# Patient Record
Sex: MALE | Race: WHITE | NOT HISPANIC OR LATINO | Employment: OTHER | URBAN - METROPOLITAN AREA
[De-identification: names, ages, dates, MRNs, and addresses within clinical notes are randomized per-mention and may not be internally consistent; named-entity substitution may affect disease eponyms.]

---

## 2018-08-03 ENCOUNTER — APPOINTMENT (OUTPATIENT)
Dept: LAB | Facility: OTHER | Age: 73
End: 2018-08-03

## 2018-08-03 ENCOUNTER — TRANSCRIBE ORDERS (OUTPATIENT)
Dept: LAB | Facility: OTHER | Age: 73
End: 2018-08-03

## 2018-08-03 DIAGNOSIS — I10 HYPERTENSION, ESSENTIAL: Primary | ICD-10-CM

## 2018-08-03 DIAGNOSIS — I10 HYPERTENSION, ESSENTIAL: ICD-10-CM

## 2018-08-03 LAB
ALBUMIN SERPL BCP-MCNC: 4.2 G/DL (ref 3.5–5)
ALP SERPL-CCNC: 95 U/L (ref 46–116)
ALT SERPL W P-5'-P-CCNC: 28 U/L (ref 12–78)
ANION GAP SERPL CALCULATED.3IONS-SCNC: 7 MMOL/L (ref 4–13)
AST SERPL W P-5'-P-CCNC: 22 U/L (ref 5–45)
BILIRUB SERPL-MCNC: 0.9 MG/DL (ref 0.2–1)
BUN SERPL-MCNC: 23 MG/DL (ref 5–25)
CALCIUM SERPL-MCNC: 9.1 MG/DL (ref 8.3–10.1)
CHLORIDE SERPL-SCNC: 106 MMOL/L (ref 100–108)
CHOLEST SERPL-MCNC: 121 MG/DL (ref 50–200)
CO2 SERPL-SCNC: 26 MMOL/L (ref 21–32)
CREAT SERPL-MCNC: 1.2 MG/DL (ref 0.6–1.3)
ERYTHROCYTE [DISTWIDTH] IN BLOOD BY AUTOMATED COUNT: 12.7 % (ref 11.6–15.1)
GFR SERPL CREATININE-BSD FRML MDRD: 60 ML/MIN/1.73SQ M
GLUCOSE P FAST SERPL-MCNC: 98 MG/DL (ref 65–99)
HCT VFR BLD AUTO: 42.4 % (ref 36.5–49.3)
HDLC SERPL-MCNC: 57 MG/DL (ref 40–60)
HGB BLD-MCNC: 13.3 G/DL (ref 12–17)
LDLC SERPL CALC-MCNC: 49 MG/DL (ref 0–100)
MCH RBC QN AUTO: 31.2 PG (ref 26.8–34.3)
MCHC RBC AUTO-ENTMCNC: 31.4 G/DL (ref 31.4–37.4)
MCV RBC AUTO: 100 FL (ref 82–98)
NONHDLC SERPL-MCNC: 64 MG/DL
PLATELET # BLD AUTO: 157 THOUSANDS/UL (ref 149–390)
PMV BLD AUTO: 13.4 FL (ref 8.9–12.7)
POTASSIUM SERPL-SCNC: 4 MMOL/L (ref 3.5–5.3)
PROT SERPL-MCNC: 7.2 G/DL (ref 6.4–8.2)
RBC # BLD AUTO: 4.26 MILLION/UL (ref 3.88–5.62)
SODIUM SERPL-SCNC: 139 MMOL/L (ref 136–145)
TRIGL SERPL-MCNC: 73 MG/DL
WBC # BLD AUTO: 6.81 THOUSAND/UL (ref 4.31–10.16)

## 2018-08-03 PROCEDURE — 85027 COMPLETE CBC AUTOMATED: CPT

## 2018-08-03 PROCEDURE — 36415 COLL VENOUS BLD VENIPUNCTURE: CPT

## 2018-08-03 PROCEDURE — 80061 LIPID PANEL: CPT

## 2018-08-03 PROCEDURE — 80053 COMPREHEN METABOLIC PANEL: CPT

## 2018-11-01 ENCOUNTER — TRANSCRIBE ORDERS (OUTPATIENT)
Dept: LAB | Facility: OTHER | Age: 73
End: 2018-11-01

## 2018-11-01 ENCOUNTER — APPOINTMENT (OUTPATIENT)
Dept: LAB | Facility: OTHER | Age: 73
End: 2018-11-01

## 2018-11-01 DIAGNOSIS — I10 HYPERTENSION, UNSPECIFIED TYPE: ICD-10-CM

## 2018-11-01 DIAGNOSIS — I10 HYPERTENSION, UNSPECIFIED TYPE: Primary | ICD-10-CM

## 2018-11-01 LAB
ALBUMIN SERPL BCP-MCNC: 3.9 G/DL (ref 3.5–5)
ALP SERPL-CCNC: 96 U/L (ref 46–116)
ALT SERPL W P-5'-P-CCNC: 24 U/L (ref 12–78)
ANION GAP SERPL CALCULATED.3IONS-SCNC: 4 MMOL/L (ref 4–13)
AST SERPL W P-5'-P-CCNC: 16 U/L (ref 5–45)
BILIRUB SERPL-MCNC: 0.83 MG/DL (ref 0.2–1)
BUN SERPL-MCNC: 26 MG/DL (ref 5–25)
CALCIUM SERPL-MCNC: 9.4 MG/DL (ref 8.3–10.1)
CHLORIDE SERPL-SCNC: 109 MMOL/L (ref 100–108)
CHOLEST SERPL-MCNC: 113 MG/DL (ref 50–200)
CO2 SERPL-SCNC: 28 MMOL/L (ref 21–32)
CREAT SERPL-MCNC: 1.18 MG/DL (ref 0.6–1.3)
ERYTHROCYTE [DISTWIDTH] IN BLOOD BY AUTOMATED COUNT: 13.2 % (ref 11.6–15.1)
GFR SERPL CREATININE-BSD FRML MDRD: 61 ML/MIN/1.73SQ M
GLUCOSE P FAST SERPL-MCNC: 89 MG/DL (ref 65–99)
HCT VFR BLD AUTO: 39.5 % (ref 36.5–49.3)
HDLC SERPL-MCNC: 59 MG/DL (ref 40–60)
HGB BLD-MCNC: 12.7 G/DL (ref 12–17)
LDLC SERPL CALC-MCNC: 37 MG/DL (ref 0–100)
MCH RBC QN AUTO: 32.2 PG (ref 26.8–34.3)
MCHC RBC AUTO-ENTMCNC: 32.2 G/DL (ref 31.4–37.4)
MCV RBC AUTO: 100 FL (ref 82–98)
NONHDLC SERPL-MCNC: 54 MG/DL
PLATELET # BLD AUTO: 138 THOUSANDS/UL (ref 149–390)
PMV BLD AUTO: 13.2 FL (ref 8.9–12.7)
POTASSIUM SERPL-SCNC: 4.2 MMOL/L (ref 3.5–5.3)
PROT SERPL-MCNC: 6.9 G/DL (ref 6.4–8.2)
RBC # BLD AUTO: 3.95 MILLION/UL (ref 3.88–5.62)
SODIUM SERPL-SCNC: 141 MMOL/L (ref 136–145)
TRIGL SERPL-MCNC: 85 MG/DL
WBC # BLD AUTO: 6.38 THOUSAND/UL (ref 4.31–10.16)

## 2018-11-01 PROCEDURE — 80053 COMPREHEN METABOLIC PANEL: CPT

## 2018-11-01 PROCEDURE — 36415 COLL VENOUS BLD VENIPUNCTURE: CPT

## 2018-11-01 PROCEDURE — 85027 COMPLETE CBC AUTOMATED: CPT

## 2018-11-01 PROCEDURE — 80061 LIPID PANEL: CPT

## 2019-11-04 ENCOUNTER — APPOINTMENT (OUTPATIENT)
Dept: LAB | Facility: IMAGING CENTER | Age: 74
End: 2019-11-04
Payer: COMMERCIAL

## 2019-11-04 ENCOUNTER — TRANSCRIBE ORDERS (OUTPATIENT)
Dept: ADMINISTRATIVE | Facility: HOSPITAL | Age: 74
End: 2019-11-04

## 2019-11-04 DIAGNOSIS — I10 ESSENTIAL HYPERTENSION: ICD-10-CM

## 2019-11-04 DIAGNOSIS — E78.2 MIXED HYPERLIPIDEMIA: Primary | ICD-10-CM

## 2019-11-04 DIAGNOSIS — E78.2 MIXED HYPERLIPIDEMIA: ICD-10-CM

## 2019-11-04 LAB
ALBUMIN SERPL BCP-MCNC: 3.9 G/DL (ref 3.5–5)
ALP SERPL-CCNC: 88 U/L (ref 46–116)
ALT SERPL W P-5'-P-CCNC: 26 U/L (ref 12–78)
ANION GAP SERPL CALCULATED.3IONS-SCNC: 6 MMOL/L (ref 4–13)
AST SERPL W P-5'-P-CCNC: 19 U/L (ref 5–45)
BILIRUB SERPL-MCNC: 0.71 MG/DL (ref 0.2–1)
BUN SERPL-MCNC: 24 MG/DL (ref 5–25)
CALCIUM SERPL-MCNC: 9.3 MG/DL (ref 8.3–10.1)
CHLORIDE SERPL-SCNC: 108 MMOL/L (ref 100–108)
CHOLEST SERPL-MCNC: 124 MG/DL (ref 50–200)
CO2 SERPL-SCNC: 26 MMOL/L (ref 21–32)
CREAT SERPL-MCNC: 1.3 MG/DL (ref 0.6–1.3)
ERYTHROCYTE [DISTWIDTH] IN BLOOD BY AUTOMATED COUNT: 12.6 % (ref 11.6–15.1)
GFR SERPL CREATININE-BSD FRML MDRD: 54 ML/MIN/1.73SQ M
GLUCOSE P FAST SERPL-MCNC: 113 MG/DL (ref 65–99)
HCT VFR BLD AUTO: 44.2 % (ref 36.5–49.3)
HDLC SERPL-MCNC: 60 MG/DL
HGB BLD-MCNC: 14.1 G/DL (ref 12–17)
LDLC SERPL CALC-MCNC: 47 MG/DL (ref 0–100)
MCH RBC QN AUTO: 31.5 PG (ref 26.8–34.3)
MCHC RBC AUTO-ENTMCNC: 31.9 G/DL (ref 31.4–37.4)
MCV RBC AUTO: 99 FL (ref 82–98)
NONHDLC SERPL-MCNC: 64 MG/DL
PLATELET # BLD AUTO: 173 THOUSANDS/UL (ref 149–390)
PMV BLD AUTO: 12.6 FL (ref 8.9–12.7)
POTASSIUM SERPL-SCNC: 3.8 MMOL/L (ref 3.5–5.3)
PROT SERPL-MCNC: 7 G/DL (ref 6.4–8.2)
RBC # BLD AUTO: 4.48 MILLION/UL (ref 3.88–5.62)
SODIUM SERPL-SCNC: 140 MMOL/L (ref 136–145)
TRIGL SERPL-MCNC: 83 MG/DL
WBC # BLD AUTO: 6.33 THOUSAND/UL (ref 4.31–10.16)

## 2019-11-04 PROCEDURE — 85027 COMPLETE CBC AUTOMATED: CPT

## 2019-11-04 PROCEDURE — 36415 COLL VENOUS BLD VENIPUNCTURE: CPT

## 2019-11-04 PROCEDURE — 80061 LIPID PANEL: CPT

## 2019-11-04 PROCEDURE — 80053 COMPREHEN METABOLIC PANEL: CPT

## 2020-10-22 ENCOUNTER — NURSING HOME VISIT (OUTPATIENT)
Dept: GERIATRICS | Facility: OTHER | Age: 75
End: 2020-10-22
Payer: COMMERCIAL

## 2020-10-22 VITALS
WEIGHT: 217.4 LBS | RESPIRATION RATE: 18 BRPM | OXYGEN SATURATION: 98 % | SYSTOLIC BLOOD PRESSURE: 125 MMHG | TEMPERATURE: 97 F | DIASTOLIC BLOOD PRESSURE: 69 MMHG | HEART RATE: 77 BPM

## 2020-10-22 DIAGNOSIS — I48.91 ATRIAL FIBRILLATION (HCC): ICD-10-CM

## 2020-10-22 DIAGNOSIS — J96.20 ACUTE ON CHRONIC RESPIRATORY FAILURE (HCC): ICD-10-CM

## 2020-10-22 DIAGNOSIS — F41.9 ANXIETY: ICD-10-CM

## 2020-10-22 DIAGNOSIS — J12.82 PNEUMONIA DUE TO COVID-19 VIRUS: ICD-10-CM

## 2020-10-22 DIAGNOSIS — J44.9 COPD (CHRONIC OBSTRUCTIVE PULMONARY DISEASE) (HCC): Primary | ICD-10-CM

## 2020-10-22 DIAGNOSIS — I10 HYPERTENSION: ICD-10-CM

## 2020-10-22 DIAGNOSIS — U07.1 PNEUMONIA DUE TO COVID-19 VIRUS: ICD-10-CM

## 2020-10-22 DIAGNOSIS — N40.0 BPH (BENIGN PROSTATIC HYPERPLASIA): ICD-10-CM

## 2020-10-22 DIAGNOSIS — E78.5 HYPERLIPIDEMIA: ICD-10-CM

## 2020-10-22 PROCEDURE — 99306 1ST NF CARE HIGH MDM 50: CPT | Performed by: INTERNAL MEDICINE

## 2020-10-22 RX ORDER — PREDNISONE 10 MG/1
10 TABLET ORAL DAILY
COMMUNITY

## 2020-10-22 RX ORDER — IPRATROPIUM BROMIDE AND ALBUTEROL SULFATE 2.5; .5 MG/3ML; MG/3ML
3 SOLUTION RESPIRATORY (INHALATION) 4 TIMES DAILY
COMMUNITY

## 2020-10-22 RX ORDER — LORATADINE 10 MG/1
10 TABLET ORAL DAILY
COMMUNITY

## 2020-10-22 RX ORDER — BUSPIRONE HYDROCHLORIDE 10 MG/1
10 TABLET ORAL 2 TIMES DAILY
COMMUNITY

## 2020-10-22 RX ORDER — DILTIAZEM HYDROCHLORIDE 120 MG/1
120 CAPSULE, EXTENDED RELEASE ORAL DAILY
COMMUNITY

## 2020-10-22 RX ORDER — SULFASALAZINE 500 MG/1
500 TABLET ORAL DAILY
COMMUNITY

## 2020-10-22 RX ORDER — ATORVASTATIN CALCIUM 40 MG/1
40 TABLET, FILM COATED ORAL DAILY
COMMUNITY

## 2020-10-22 RX ORDER — TAMSULOSIN HYDROCHLORIDE 0.4 MG/1
0.4 CAPSULE ORAL
COMMUNITY

## 2020-11-20 ENCOUNTER — TRANSCRIBE ORDERS (OUTPATIENT)
Dept: ADMINISTRATIVE | Facility: HOSPITAL | Age: 75
End: 2020-11-20

## 2020-11-20 ENCOUNTER — LAB (OUTPATIENT)
Dept: LAB | Facility: IMAGING CENTER | Age: 75
End: 2020-11-20
Payer: COMMERCIAL

## 2020-11-20 DIAGNOSIS — E66.09 EXOGENOUS OBESITY: ICD-10-CM

## 2020-11-20 DIAGNOSIS — Z87.891 PERSONAL HISTORY OF TOBACCO USE, PRESENTING HAZARDS TO HEALTH: ICD-10-CM

## 2020-11-20 DIAGNOSIS — Z82.49 FAMILY HISTORY OF ISCHEMIC HEART DISEASE: ICD-10-CM

## 2020-11-20 DIAGNOSIS — E78.2 MIXED HYPERLIPIDEMIA: ICD-10-CM

## 2020-11-20 DIAGNOSIS — I10 HYPERTENSION, UNSPECIFIED TYPE: Primary | ICD-10-CM

## 2020-11-20 DIAGNOSIS — F41.1 GENERALIZED ANXIETY DISORDER: ICD-10-CM

## 2020-11-20 DIAGNOSIS — I10 HYPERTENSION, UNSPECIFIED TYPE: ICD-10-CM

## 2020-11-20 LAB
ANION GAP SERPL CALCULATED.3IONS-SCNC: 4 MMOL/L (ref 4–13)
BUN SERPL-MCNC: 15 MG/DL (ref 5–25)
CALCIUM SERPL-MCNC: 9.1 MG/DL (ref 8.3–10.1)
CHLORIDE SERPL-SCNC: 112 MMOL/L (ref 100–108)
CHOLEST SERPL-MCNC: 106 MG/DL (ref 50–200)
CO2 SERPL-SCNC: 27 MMOL/L (ref 21–32)
CREAT SERPL-MCNC: 0.98 MG/DL (ref 0.6–1.3)
ERYTHROCYTE [DISTWIDTH] IN BLOOD BY AUTOMATED COUNT: 15.3 % (ref 11.6–15.1)
GFR SERPL CREATININE-BSD FRML MDRD: 76 ML/MIN/1.73SQ M
GLUCOSE P FAST SERPL-MCNC: 95 MG/DL (ref 65–99)
HCT VFR BLD AUTO: 36.5 % (ref 36.5–49.3)
HDLC SERPL-MCNC: 48 MG/DL
HGB BLD-MCNC: 11.3 G/DL (ref 12–17)
LDLC SERPL CALC-MCNC: 44 MG/DL (ref 0–100)
MCH RBC QN AUTO: 31.7 PG (ref 26.8–34.3)
MCHC RBC AUTO-ENTMCNC: 31 G/DL (ref 31.4–37.4)
MCV RBC AUTO: 102 FL (ref 82–98)
NONHDLC SERPL-MCNC: 58 MG/DL
PLATELET # BLD AUTO: 160 THOUSANDS/UL (ref 149–390)
PMV BLD AUTO: 12.8 FL (ref 8.9–12.7)
POTASSIUM SERPL-SCNC: 4 MMOL/L (ref 3.5–5.3)
RBC # BLD AUTO: 3.57 MILLION/UL (ref 3.88–5.62)
SODIUM SERPL-SCNC: 143 MMOL/L (ref 136–145)
TRIGL SERPL-MCNC: 72 MG/DL
WBC # BLD AUTO: 6.15 THOUSAND/UL (ref 4.31–10.16)

## 2020-11-20 PROCEDURE — 85027 COMPLETE CBC AUTOMATED: CPT

## 2020-11-20 PROCEDURE — 80048 BASIC METABOLIC PNL TOTAL CA: CPT

## 2020-11-20 PROCEDURE — 80061 LIPID PANEL: CPT

## 2020-11-20 PROCEDURE — 36415 COLL VENOUS BLD VENIPUNCTURE: CPT

## 2021-06-28 ENCOUNTER — EVALUATION (OUTPATIENT)
Dept: PHYSICAL THERAPY | Facility: REHABILITATION | Age: 76
End: 2021-06-28
Payer: COMMERCIAL

## 2021-06-28 DIAGNOSIS — M54.50 LUMBAR SPINE PAIN: Primary | ICD-10-CM

## 2021-06-28 PROCEDURE — 97161 PT EVAL LOW COMPLEX 20 MIN: CPT | Performed by: PHYSICAL THERAPIST

## 2021-06-28 PROCEDURE — 97110 THERAPEUTIC EXERCISES: CPT | Performed by: PHYSICAL THERAPIST

## 2021-06-28 NOTE — LETTER
2021    Stefanie Chandler MD  [id number] [employer]    Patient: [facility]   YOB: 1945   Date of Visit: 2021     Encounter Diagnosis     ICD-10-CM    1  Lumbar spine pain  M54 5        Dear Dr Shelia Becerril: Thank you for your recent referral of oHward Garcia  Please review the attached evaluation summary from Alexander's recent visit  Please verify that you agree with the plan of care by signing the attached order  If you have any questions or concerns, please do not hesitate to call  I sincerely appreciate the opportunity to share in the care of one of your patients and hope to have another opportunity to work with you in the near future  Sincerely,    Lawrence Gutierrez, PT      Referring Provider:      I certify that I have read the below Plan of Care and certify the need for these services furnished under this plan of treatment while under my care  Stefanie Chandler MD  [address] [employer]  Via Fax: [phone]          PT Evaluation     Today's date: 2021  Patient name: Howard Garcia  : 1945  MRN: [id number]  Referring provider: No ref  provider found  Dx:   Encounter Diagnosis     ICD-10-CM    1  Lumbar spine pain  M54 5                   Assessment  Assessment details: Pt is a pleasant 77 y o  male presenting to outpatient physical therapy with Lumbar spine pain  (primary encounter diagnosis)  Pt presents with pain, decreased range of motion, decreased strength, and decreased tolerance to activity  Pt displays movement impairment diagnosis of L lumbar hypomobility dysfunction  Pt is a good candidate for outpatient physical therapy and would benefit from skilled physical therapy to address limitations and to achieve goals   Thank you for this referral    Impairments: abnormal coordination, abnormal or restricted ROM, activity intolerance, impaired physical strength and pain with function  Understanding of Dx/Px/POC: good   Prognosis: good    Goals  ST  Patient will report 25% decrease in pain to be able to walk 0 25 mile outside without limitations in 4 weeks  2  Patient will demonstrate 25% improvement in ROM to be able to transfer out of bed without discomfort in 4 weeks  LT  Patient will be able to perform IADLS without restriction or pain by discharge  2  Patient will be independent in HEP by discharge  3  Patient will be able to return to recreational/work duties without restriction or pain by discharge  Plan  Patient would benefit from: PT eval and skilled PT  Planned modality interventions: cryotherapy and thermotherapy: hydrocollator packs  Planned therapy interventions: IADL retraining, body mechanics training, flexibility, functional ROM exercises, home exercise program, neuromuscular re-education, manual therapy, postural training, strengthening, stretching, therapeutic activities, therapeutic exercise and joint mobilization  Frequency: 1x week  Duration in visits: 6  Duration in weeks: 6  Treatment plan discussed with: patient        Subjective Evaluation    History of Present Illness  Mechanism of injury: 21  Pt comes to therapy reporting 25 year history of low back pain, attributed to surgery performed  States since this time he's had low back issues, however, notes symptoms seem to have worsened over the past 1-2 months of unknown reason  States he consulted his orthopedic physician, who he states performed radiographs on low back, which revealed arthritis and degenerative issues of hip and low back  Pt reports he feels most discomfort upon waking in the morning, until he "cracks" his back, which helps to ease pain or a low level soreness that lasts throughout the day  Denies limitations performing ADLS due to back pain, however, notes soreness is present       AGGS: sleeping on side, first thing in the morning, walking outside (> 0 25 mile), no pain with walking on treadmill   LOC: left and center lumbar region  Denies right sided back pain  Denies radiating symptoms down buttock or lower extremities  Denies paresthesias or bowel/bladded dysfunction  EASES: "cracks" first thing in the morning, Biofreeze, Tylenol, sitting    Follows up with referring physician in 6-7 weeks  GOALS: relieve pain in the morning     Pain  Current pain ratin  At worst pain ratin    Patient Goals  Patient goals for therapy: decreased pain          Objective     Palpation   Left   Hypertonic in the erector spinae and lumbar paraspinals  Tenderness of the erector spinae and lumbar paraspinals  Right   No palpable tenderness to the erector spinae and lumbar paraspinals  Active Range of Motion     Lumbar   Flexion:  WFL  Extension:  Restriction level: minimal  Left lateral flexion:  with pain Restriction level: maximal  Right lateral flexion:  with pain Restriction level: maximal  Left rotation:  Restriction level: minimal  Right rotation:  Restriction level: minimal    Additional Active Range of Motion Details  21  Greatest pain reported with left > right lateral flexion, with patient on L side b/l    Joint Play     Hypomobile: L1, L2, L3, L4 and L5     Strength/Myotome Testing     Left Hip   Planes of Motion   Flexion: 4  External rotation: 4  Internal rotation: 4-    Right Hip   Planes of Motion   Flexion: 4+  External rotation: 4  Internal rotation: 4-    Left Knee   Flexion: 5  Extension: 5    Right Knee   Flexion: 5  Extension: 5    Tests     Lumbar     Left   Negative passive SLR  Right   Negative passive SLR  Left Hip   Negative GLENDY, piriformis and scour                Precautions: HTN    Date             FOTO IE            Re-eval IE                         Manuals    LS sidelying rot mobs                                                    Neuro Re-Ed Ther Ex    LTR             SKTC             Piriformis str             Child's pose             Pball roll outs             sidelying QL str 20"x5                                      Ther Activity    bike                          Gait Training                              Modalities

## 2021-06-28 NOTE — PROGRESS NOTES
PT Evaluation     Today's date: 2021  Patient name: Benigno Horn  : 1945  MRN: [id number]  Referring provider: No ref  provider found  Dx:   Encounter Diagnosis     ICD-10-CM    1  Lumbar spine pain  M54 5                   Assessment  Assessment details: Pt is a pleasant 77 y o  male presenting to outpatient physical therapy with Lumbar spine pain  (primary encounter diagnosis)  Pt presents with pain, decreased range of motion, decreased strength, and decreased tolerance to activity  Pt displays movement impairment diagnosis of L lumbar hypomobility dysfunction  Pt is a good candidate for outpatient physical therapy and would benefit from skilled physical therapy to address limitations and to achieve goals  Thank you for this referral    Impairments: abnormal coordination, abnormal or restricted ROM, activity intolerance, impaired physical strength and pain with function  Understanding of Dx/Px/POC: good   Prognosis: good    Goals  ST  Patient will report 25% decrease in pain to be able to walk 0 25 mile outside without limitations in 4 weeks  2  Patient will demonstrate 25% improvement in ROM to be able to transfer out of bed without discomfort in 4 weeks  LT  Patient will be able to perform IADLS without restriction or pain by discharge  2  Patient will be independent in HEP by discharge  3  Patient will be able to return to recreational/work duties without restriction or pain by discharge        Plan  Patient would benefit from: PT eval and skilled PT  Planned modality interventions: cryotherapy and thermotherapy: hydrocollator packs  Planned therapy interventions: IADL retraining, body mechanics training, flexibility, functional ROM exercises, home exercise program, neuromuscular re-education, manual therapy, postural training, strengthening, stretching, therapeutic activities, therapeutic exercise and joint mobilization  Frequency: 1x week  Duration in visits: 6  Duration in weeks: 6  Treatment plan discussed with: patient        Subjective Evaluation    History of Present Illness  Mechanism of injury: 21  Pt comes to therapy reporting 25 year history of low back pain, attributed to surgery performed  States since this time he's had low back issues, however, notes symptoms seem to have worsened over the past 1-2 months of unknown reason  States he consulted his orthopedic physician, who he states performed radiographs on low back, which revealed arthritis and degenerative issues of hip and low back  Pt reports he feels most discomfort upon waking in the morning, until he "cracks" his back, which helps to ease pain or a low level soreness that lasts throughout the day  Denies limitations performing ADLS due to back pain, however, notes soreness is present  AGGS: sleeping on side, first thing in the morning, walking outside (> 0 25 mile), no pain with walking on treadmill   LOC: left and center lumbar region  Denies right sided back pain  Denies radiating symptoms down buttock or lower extremities  Denies paresthesias or bowel/bladded dysfunction  EASES: "cracks" first thing in the morning, Biofreeze, Tylenol, sitting    Follows up with referring physician in 6-7 weeks  GOALS: relieve pain in the morning     Pain  Current pain ratin  At worst pain ratin    Patient Goals  Patient goals for therapy: decreased pain          Objective     Palpation   Left   Hypertonic in the erector spinae and lumbar paraspinals  Tenderness of the erector spinae and lumbar paraspinals  Right   No palpable tenderness to the erector spinae and lumbar paraspinals       Active Range of Motion     Lumbar   Flexion:  WFL  Extension:  Restriction level: minimal  Left lateral flexion:  with pain Restriction level: maximal  Right lateral flexion:  with pain Restriction level: maximal  Left rotation:  Restriction level: minimal  Right rotation:  Restriction level: minimal    Additional Active Range of Motion Details  06/28/21  Greatest pain reported with left > right lateral flexion, with patient on L side b/l    Joint Play     Hypomobile: L1, L2, L3, L4 and L5     Strength/Myotome Testing     Left Hip   Planes of Motion   Flexion: 4  External rotation: 4  Internal rotation: 4-    Right Hip   Planes of Motion   Flexion: 4+  External rotation: 4  Internal rotation: 4-    Left Knee   Flexion: 5  Extension: 5    Right Knee   Flexion: 5  Extension: 5    Tests     Lumbar     Left   Negative passive SLR  Right   Negative passive SLR  Left Hip   Negative GLENDY, piriformis and scour                Precautions: HTN    Date 6/28            FOTO IE            Re-eval IE                         Manuals    LS sidelying rot mobs                                                    Neuro Re-Ed                                                                                               Ther Ex    LTR             SKTC             Piriformis str             Child's pose             Pball roll outs             sidelying QL str 20"x5                                      Ther Activity    bike                          Gait Training                              Modalities

## 2021-07-06 ENCOUNTER — OFFICE VISIT (OUTPATIENT)
Dept: PHYSICAL THERAPY | Facility: REHABILITATION | Age: 76
End: 2021-07-06
Payer: COMMERCIAL

## 2021-07-06 DIAGNOSIS — M54.50 LUMBAR SPINE PAIN: Primary | ICD-10-CM

## 2021-07-06 PROCEDURE — 97110 THERAPEUTIC EXERCISES: CPT

## 2021-07-06 NOTE — PROGRESS NOTES
Daily Note     Today's date: 2021  Patient name: Jack Ramos  : 1945  MRN: [id number]  Referring provider: Genna Whitfield MD  Dx:   Encounter Diagnosis     ICD-10-CM    1  Lumbar spine pain  M54 5                   Subjective: No new changes since IE  Objective: See treatment diary below      Assessment: Tolerated treatment well  Patient is experiencing relief with self stretching, and he is able to complete all tasks without adverse effects  Continued PT would be beneficial to improve function           Plan: Continue per plan of care         Precautions: HTN    Date            FOTO IE            Re-eval IE                         Manuals    LS sidelying rot mobs                                                    Neuro Re-Ed                                                                                               Ther Ex    LTR  10x5"           SKTC  10x           Piriformis str  3x30"           Child's pose  5x20"           Pball roll outs  10x10" ea           sidelying QL str 20"x5 20" x5                                     Ther Activity    bike  5' Lv0                        Gait Training                              Modalities

## 2021-07-08 ENCOUNTER — OFFICE VISIT (OUTPATIENT)
Dept: PHYSICAL THERAPY | Facility: REHABILITATION | Age: 76
End: 2021-07-08
Payer: COMMERCIAL

## 2021-07-08 DIAGNOSIS — M54.50 LUMBAR SPINE PAIN: Primary | ICD-10-CM

## 2021-07-08 PROCEDURE — 97140 MANUAL THERAPY 1/> REGIONS: CPT

## 2021-07-08 PROCEDURE — 97110 THERAPEUTIC EXERCISES: CPT

## 2021-07-08 NOTE — PROGRESS NOTES
Daily Note     Today's date: 2021  Patient name: Cass Luz  : 1945  MRN: [id number]  Referring provider: Susanna Acevedo MD  Dx:   Encounter Diagnosis     ICD-10-CM    1  Lumbar spine pain  M54 5                   Subjective:  Patient reports that his back feels sore like usual   Patient reports compliance with performing HEP and notes no issues  Objective: See treatment diary below      Assessment: Tolerated treatment well  Patient performed ex and received manual therapy as noted below  Patient noted some mild soreness post treatment with improved mobility  Patient exhibited good technique with therapeutic exercises and would benefit from continued PT to attain set goals  Plan: Continue per plan of care        Precautions: HTN    Date           FOTO IE            Re-eval IE                         Manuals    LS sidelying rot mobs   JG                                                 Neuro Re-Ed                                                                                               Ther Ex    LTR  10x5" 20"x5          SKTC  10x 20"x5          Piriformis str  3x30" 20"x5          Child's pose  5x20" 20"x5          Pball roll outs  10x10" ea 10x10"          sidelying QL str 20"x5 20" x5 20"x5 ea                                    Ther Activity    bike  5' Lv0 6'                       Gait Training                              Modalities

## 2021-07-13 ENCOUNTER — OFFICE VISIT (OUTPATIENT)
Dept: PHYSICAL THERAPY | Facility: REHABILITATION | Age: 76
End: 2021-07-13
Payer: COMMERCIAL

## 2021-07-13 DIAGNOSIS — M54.50 LUMBAR SPINE PAIN: Primary | ICD-10-CM

## 2021-07-13 PROCEDURE — 97110 THERAPEUTIC EXERCISES: CPT

## 2021-07-13 PROCEDURE — 97140 MANUAL THERAPY 1/> REGIONS: CPT

## 2021-07-13 NOTE — PROGRESS NOTES
Daily Note     Today's date: 2021  Patient name: Nieves Conti  : 1945  MRN: [id number]  Referring provider: Ramona Barber MD  Dx:   Encounter Diagnosis     ICD-10-CM    1  Lumbar spine pain  M54 5                   Subjective:  Patient reports that he has soreness after he does the exercises but once he is up and walks for a couple minutes his back feels pretty good  Patient notes that his walking is still not what he would like but he knows his L hip is bad and that may be affecting it also  Objective: See treatment diary below      Assessment: Tolerated treatment well  Patient demonstrates a good understanding of the exercises performed and reports compliance with HEP  Patient exhibited good technique with therapeutic exercises and would benefit from continued PT to attain set goals  Plan: Continue per plan of care        Precautions: HTN    Date          FOTO IE            Re-eval IE                         Manuals    LS sidelying rot mobs   JG JG                                                Neuro Re-Ed                                                                                               Ther Ex    LTR  10x5" 20"x5 20"x5         SKTC  10x 20"x5 20"x5         Piriformis str  3x30" 20"x5 20"x5         Child's pose  5x20" 20"x5 20"         Pball roll outs  10x10" ea 10x10" 10"x10  10"x5 R/L         sidelying QL str 20"x5 20" x5 20"x5 ea 20"x5 ea                                   Ther Activity    bike  5' Lv0 6' x6'                      Gait Training                              Modalities

## 2021-07-15 ENCOUNTER — OFFICE VISIT (OUTPATIENT)
Dept: PHYSICAL THERAPY | Facility: REHABILITATION | Age: 76
End: 2021-07-15
Payer: COMMERCIAL

## 2021-07-15 DIAGNOSIS — M54.50 LUMBAR SPINE PAIN: Primary | ICD-10-CM

## 2021-07-15 PROCEDURE — 97110 THERAPEUTIC EXERCISES: CPT

## 2021-07-15 PROCEDURE — 97140 MANUAL THERAPY 1/> REGIONS: CPT

## 2021-07-15 NOTE — PROGRESS NOTES
Daily Note     Today's date: 7/15/2021  Patient name: Carmelo Laureano  : 1945  MRN: [id number]  Referring provider: Kati Graf MD  Dx:   Encounter Diagnosis     ICD-10-CM    1  Lumbar spine pain  M54 5                   Subjective:  Sebastián Mena reports that his back is feeling better  He notes less ache across his low back  Objective: See treatment diary below      Assessment: Patient tolerated treatment well  Added hamstring stretch to address soft tissue tightness    Patient noted increased mobility post treatment  Patient demonstrated fatigue post treatment, exhibited good technique with therapeutic exercises and would benefit from continued PT  Plan: Continue per plan of care        Precautions: HTN    Date 6/28 7/6 7/8 7/13 7/15        FOTO IE            Re-eval IE                         Manuals    LS sidelying rot mobs   [address]                                               Neuro Re-Ed                                                                                               Ther Ex    LTR  10x5" 20"x5 20"x5 20"x5        SKTC  10x 20"x5 20"x5 20"x5        Piriformis str  3x30" 20"x5 20"x5 20"x5        Child's pose  5x20" 20"x5 20"x5 20"x5        Pball roll outs  10x10" ea 10x10" 10"x10  10"x5 R/L 10"x10 10"x5 R/L        sidelying QL str 20"x5 20" x5 20"x5 ea 20"x5 ea 20"x5 ea        Hamstring str seated     20"x5                     Ther Activity    bike  5' Lv0 6' x6' x6'                     Gait Training                              Modalities

## 2021-07-20 ENCOUNTER — EVALUATION (OUTPATIENT)
Dept: PHYSICAL THERAPY | Facility: REHABILITATION | Age: 76
End: 2021-07-20
Payer: COMMERCIAL

## 2021-07-20 DIAGNOSIS — M54.50 LUMBAR SPINE PAIN: Primary | ICD-10-CM

## 2021-07-20 PROCEDURE — 97530 THERAPEUTIC ACTIVITIES: CPT | Performed by: PHYSICAL THERAPIST

## 2021-07-20 PROCEDURE — 97110 THERAPEUTIC EXERCISES: CPT | Performed by: PHYSICAL THERAPIST

## 2021-07-20 NOTE — PROGRESS NOTES
PT Re-Evaluation  and PT Discharge    Today's date: 2021  Patient name: Anais Sanders  : 1945  MRN: [id number]  Referring provider: Kunal Ortiz MD  Dx:   Encounter Diagnosis     ICD-10-CM    1  Lumbar spine pain  M54 5                   Assessment  Assessment details: Anais Sanders has been treated in outpatient physical therapy over the past 4 weeks for diagnosis/complaints of Lumbar spine pain  (primary encounter diagnosis)  Pt demonstrates increased range of motion, improved strength, decreased pain, and increased activity tolerance  Pt has achieved goals at present time  Pt appropriate to be discharged at present time to [facility]  Thank you for the opportunity to share in this patient's care  Impairments: abnormal coordination, abnormal or restricted ROM, activity intolerance, impaired physical strength and pain with function  Understanding of Dx/Px/POC: good   Prognosis: good    Goals  ST  Patient will report 25% decrease in pain to be able to walk 0 25 mile outside without limitations in 4 weeks  - MET  2  Patient will demonstrate 25% improvement in ROM to be able to transfer out of bed without discomfort in 4 weeks  - MET    LT  Patient will be able to perform IADLS without restriction or pain by discharge  - MET  2  Patient will be independent in HEP by discharge  - MET  3  Patient will be able to return to recreational/work duties without restriction or pain by discharge  - MET      Plan  Plan details: Discharge to [facility]    Patient would benefit from: PT eval and skilled PT  Planned modality interventions: cryotherapy and thermotherapy: hydrocollator packs  Planned therapy interventions: IADL retraining, body mechanics training, flexibility, functional ROM exercises, home exercise program, neuromuscular re-education, manual therapy, postural training, strengthening, stretching, therapeutic activities, therapeutic exercise and joint mobilization  Frequency: 1x week  Duration in visits: 6  Duration in weeks: 6  Treatment plan discussed with: patient        Subjective Evaluation    History of Present Illness  Mechanism of injury: 21  Pt comes to therapy reporting 25 year history of low back pain, attributed to surgery performed  States since this time he's had low back issues, however, notes symptoms seem to have worsened over the past 1-2 months of unknown reason  States he consulted his orthopedic physician, who he states performed radiographs on low back, which revealed arthritis and degenerative issues of hip and low back  Pt reports he feels most discomfort upon waking in the morning, until he "cracks" his back, which helps to ease pain or a low level soreness that lasts throughout the day  Denies limitations performing ADLS due to back pain, however, notes soreness is present  AGGS: sleeping on side, first thing in the morning, walking outside (> 0 25 mile), no pain with walking on treadmill   LOC: left and center lumbar region  Denies right sided back pain  Denies radiating symptoms down buttock or lower extremities  Denies paresthesias or bowel/bladded dysfunction  EASES: "cracks" first thing in the morning, Biofreeze, Tylenol, sitting    Follows up with referring physician in 6-7 weeks  GOALS: relieve pain in the morning       21  Pt comes to therapy stating he feels his back aching and soreness has improved since starting therapy  However, notes he feels his motion has likely "improved as much as it is going to at this point " states he purchased a treadmill and has been walking 1 mile/day  States he planes to trial walking on outdoor track later this week  Pain  Current pain ratin  At worst pain rating: 3    Patient Goals  Patient goals for therapy: decreased pain          Objective     Palpation   Left   Hypertonic in the erector spinae and lumbar paraspinals  Tenderness of the erector spinae and lumbar paraspinals       Right   No palpable tenderness to the erector spinae and lumbar paraspinals  Active Range of Motion     Lumbar   Flexion:  WFL  Extension:  Restriction level: minimal  Left lateral flexion:  Restriction level: moderate  Right lateral flexion:  Restriction level: moderate  Left rotation:  Restriction level: minimal  Right rotation:  Restriction level: minimal    Additional Active Range of Motion Details  06/28/21  Greatest pain reported with left > right lateral flexion, with patient on L side b/l    07/20/21  Denied pain in all planes of motion  Joint Play     Hypomobile: L1, L2, L3, L4 and L5     Strength/Myotome Testing     Left Hip   Planes of Motion   Flexion: 4  External rotation: 4  Internal rotation: 4-    Right Hip   Planes of Motion   Flexion: 4+  External rotation: 4  Internal rotation: 4-    Left Knee   Flexion: 5  Extension: 5    Right Knee   Flexion: 5  Extension: 5    Tests     Lumbar     Left   Negative passive SLR  Right   Negative passive SLR  Left Hip   Negative GLENDY, piriformis and scour                Precautions: HTN    Date 6/28 7/6 7/8 7/13 7/15 7/20       Re-eval IE     perf                    Manuals    LS sidelying rot mobs   [address]                                              Neuro Re-Ed                                                                                               Ther Ex    LTR  10x5" 20"x5 20"x5 20"x5 20"x5       SKTC  10x 20"x5 20"x5 20"x5 20"x5       Piriformis str  3x30" 20"x5 20"x5 20"x5 20"x5       Child's pose  5x20" 20"x5 20"x5 20"x5 20"x5       Pball roll outs  10x10" ea 10x10" 10"x10  10"x5 R/L 10"x10 10"x5 R/L 10"x10 10"x5 R/L       sidelying QL str 20"x5 20" x5 20"x5 ea 20"x5 ea 20"x5 ea 20"x5 ea       Hamstring str seated     20"x5                     Ther Activity    bike  5' Lv0 6' x6' x6' 6'                    Gait Training                              Modalities

## 2021-07-22 ENCOUNTER — APPOINTMENT (OUTPATIENT)
Dept: PHYSICAL THERAPY | Facility: REHABILITATION | Age: 76
End: 2021-07-22
Payer: COMMERCIAL

## 2021-07-27 ENCOUNTER — APPOINTMENT (OUTPATIENT)
Dept: PHYSICAL THERAPY | Facility: REHABILITATION | Age: 76
End: 2021-07-27
Payer: COMMERCIAL

## 2021-07-29 ENCOUNTER — APPOINTMENT (OUTPATIENT)
Dept: PHYSICAL THERAPY | Facility: REHABILITATION | Age: 76
End: 2021-07-29
Payer: COMMERCIAL